# Patient Record
Sex: FEMALE | URBAN - METROPOLITAN AREA
[De-identification: names, ages, dates, MRNs, and addresses within clinical notes are randomized per-mention and may not be internally consistent; named-entity substitution may affect disease eponyms.]

---

## 2018-01-24 ENCOUNTER — APPOINTMENT (RX ONLY)
Dept: URBAN - METROPOLITAN AREA CLINIC 116 | Facility: CLINIC | Age: 68
Setting detail: DERMATOLOGY
End: 2018-01-24

## 2018-01-24 DIAGNOSIS — L98.0 PYOGENIC GRANULOMA: ICD-10-CM

## 2018-01-24 PROBLEM — I63.50 CEREBRAL INFARCTION DUE TO UNSPECIFIED OCCLUSION OR STENOSIS OF UNSPECIFIED CEREBRAL ARTERY: Status: ACTIVE | Noted: 2018-01-24

## 2018-01-24 PROBLEM — L70.0 ACNE VULGARIS: Status: ACTIVE | Noted: 2018-01-24

## 2018-01-24 PROCEDURE — ? DEFER

## 2018-01-24 PROCEDURE — ? PRESCRIPTION

## 2018-01-24 PROCEDURE — ? COUNSELING

## 2018-01-24 PROCEDURE — 99201: CPT | Mod: NC

## 2018-01-24 PROCEDURE — ? TREATMENT REGIMEN

## 2018-01-24 RX ORDER — SAL ACID/UREA/PETROLATUM,WHITE 5 %-10 %
OINTMENT (GRAM) TOPICAL
Qty: 1 | Refills: 0 | Status: ERX | COMMUNITY
Start: 2018-01-24

## 2018-01-24 RX ADMIN — Medication 1: at 00:00

## 2018-01-24 ASSESSMENT — LOCATION SIMPLE DESCRIPTION DERM: LOCATION SIMPLE: RIGHT THUMB

## 2018-01-24 ASSESSMENT — LOCATION ZONE DERM: LOCATION ZONE: FINGER

## 2018-01-24 ASSESSMENT — LOCATION DETAILED DESCRIPTION DERM: LOCATION DETAILED: PERIUNGUAL SKIN RIGHT THUMB

## 2018-01-24 NOTE — PROCEDURE: TREATMENT REGIMEN
Detail Level: Zone
Plan: 1.  Discussed in simple terms what a pyogenic granuloma was and that trauma (her picking at the wound after she pulled the cuticle caused an influx of blood vessels and formed the growth (PG). \\nThe pt was shown internet images of PG involving the periungual/nail. \\n2. Discussed in detail with the pt that referral to a hand/plastic surgeon was indicated for definitive removal of the growth due to the location of the growth with blood vessels feeding into the growth and due to the following risks and complications: profuse bleeding, pain (need proper anesthesia technique), recurrence and problems with healing. \\n3. The pt declined referral to a hand/plastic surgeon and argued she wanted the doctor to just go ahead and remove the lesion despite the risks and complications discussed. She then demanded if the procedure was not done in clinic today, then she be given a treatment that guarantees the growth/PG will dry out and fall off. She was told there was no known treatment that would guarantee drying and removal of the growth. \\n4. The pt stated she preferred to continue to use hydrogen peroxide soakswhich she has been using to dry the lesion and wanted other recommendations to dry the lesion. \\nDiscussed this was not the recommended treatment option. Discussed OTC Domeboro soak ( soak 5-10 mins twice daily x 3 days ) or Epsom salts and to avoid traumatizing the growth. The patient continued to argue and declined the advise given by the doctor. She left the office following her own decision use soaks until she is ready to have the lesion removed by a hand/ plastic surgeon.

## 2018-01-24 NOTE — HPI: SKIN LESION
How Severe Is Your Skin Lesion?: mild
Has Your Skin Lesion Been Treated?: not been treated
Is This A New Presentation, Or A Follow-Up?: Growth
Additional History: Patient is present to rule out skin cancer of suspicious lesion.

## 2018-01-24 NOTE — PROCEDURE: DEFER
Procedure To Be Performed At Next Visit: Excision
Instructions (Optional): Referred to hand/plastic surgeon due to friable hemorrhagic lesion at nail bed/periungual connected to blood vessels.
Detail Level: Detailed
Scheduling Instructions (Optional): Dr. Destinee Peoples

## 2020-02-26 ENCOUNTER — APPOINTMENT (RX ONLY)
Dept: URBAN - METROPOLITAN AREA CLINIC 116 | Facility: CLINIC | Age: 70
Setting detail: DERMATOLOGY
End: 2020-02-26

## 2020-02-26 DIAGNOSIS — L60.9 NAIL DISORDER, UNSPECIFIED: ICD-10-CM

## 2020-02-26 PROCEDURE — 99213 OFFICE O/P EST LOW 20 MIN: CPT

## 2020-02-26 PROCEDURE — ? NAIL CLIPPING FOR PAS

## 2020-02-26 PROCEDURE — ? COUNSELING

## 2020-02-26 ASSESSMENT — LOCATION ZONE DERM: LOCATION ZONE: TOENAIL

## 2020-02-26 ASSESSMENT — LOCATION DETAILED DESCRIPTION DERM: LOCATION DETAILED: LEFT GREAT TOENAIL

## 2020-02-26 ASSESSMENT — LOCATION SIMPLE DESCRIPTION DERM: LOCATION SIMPLE: LEFT GREAT TOE

## 2020-02-26 NOTE — PROCEDURE: NAIL CLIPPING FOR PAS
Detail Level: Detailed
Billing Type: [employer]
Add [id number] To Bill?: No
Lab: [facility]
Lab Facility: [address]

## 2020-03-11 ENCOUNTER — RX ONLY (OUTPATIENT)
Age: 70
Setting detail: RX ONLY
End: 2020-03-11

## 2020-03-11 RX ORDER — CICLOPIROX 80 MG/ML
1 SOLUTION TOPICAL QD
Qty: 1 | Refills: 2 | Status: ERX | COMMUNITY
Start: 2020-03-11

## 2020-03-11 RX ORDER — TAVABOROLE 43.5 MG/ML
1 SOLUTION TOPICAL QD
Qty: 1 | Refills: 7 | Status: ERX | COMMUNITY
Start: 2020-03-11